# Patient Record
Sex: MALE | Race: WHITE | NOT HISPANIC OR LATINO | Employment: OTHER | URBAN - NONMETROPOLITAN AREA
[De-identification: names, ages, dates, MRNs, and addresses within clinical notes are randomized per-mention and may not be internally consistent; named-entity substitution may affect disease eponyms.]

---

## 2024-11-11 ENCOUNTER — TELEPHONE (OUTPATIENT)
Dept: INTERNAL MEDICINE | Facility: CLINIC | Age: 74
End: 2024-11-11
Payer: MEDICARE

## 2024-11-11 NOTE — TELEPHONE ENCOUNTER
Caller: Mynor Flanagan    Relationship to patient: Self    Best call back number: [phone]     Chief complaint:     Type of visit: NEW PATIENT     Requested date:      If rescheduling, when is the original appointment:      Additional notes:PATIENT WAS TOLD BY DR CARTER THAT HE WOULD TAKE HIM ON AS A NEW PATIENT.

## 2024-11-11 NOTE — TELEPHONE ENCOUNTER
Do you care to schedule patient, I have 3 other patient's asking for sooner appointments because they have been bumped. Wherever there is a 30 min slot can you put him in please.

## 2025-02-14 ENCOUNTER — OFFICE VISIT (OUTPATIENT)
Dept: INTERNAL MEDICINE | Facility: CLINIC | Age: 75
End: 2025-02-14
Payer: MEDICARE

## 2025-02-14 VITALS
OXYGEN SATURATION: 94 % | DIASTOLIC BLOOD PRESSURE: 66 MMHG | SYSTOLIC BLOOD PRESSURE: 142 MMHG | BODY MASS INDEX: 30.52 KG/M2 | WEIGHT: 218 LBS | RESPIRATION RATE: 16 BRPM | HEART RATE: 83 BPM | TEMPERATURE: 96.7 F | HEIGHT: 71 IN

## 2025-02-14 DIAGNOSIS — E55.9 VITAMIN D DEFICIENCY, UNSPECIFIED: ICD-10-CM

## 2025-02-14 DIAGNOSIS — I10 PRIMARY HYPERTENSION: ICD-10-CM

## 2025-02-14 DIAGNOSIS — E11.65 TYPE 2 DIABETES MELLITUS WITH HYPERGLYCEMIA, WITHOUT LONG-TERM CURRENT USE OF INSULIN: Primary | ICD-10-CM

## 2025-02-14 DIAGNOSIS — G47.33 OBSTRUCTIVE SLEEP APNEA SYNDROME: ICD-10-CM

## 2025-02-14 DIAGNOSIS — G47.33 OSA (OBSTRUCTIVE SLEEP APNEA): ICD-10-CM

## 2025-02-14 RX ORDER — LEVOTHYROXINE SODIUM 75 UG/1
75 TABLET ORAL DAILY
COMMUNITY
Start: 2025-01-05

## 2025-02-14 RX ORDER — LISINOPRIL AND HYDROCHLOROTHIAZIDE 10; 12.5 MG/1; MG/1
1 TABLET ORAL DAILY
Qty: 90 TABLET | Refills: 3 | Status: SHIPPED | OUTPATIENT
Start: 2025-02-14

## 2025-02-14 RX ORDER — LISINOPRIL 10 MG/1
10 TABLET ORAL DAILY
Qty: 90 TABLET | Refills: 3 | Status: SHIPPED | OUTPATIENT
Start: 2025-02-14 | End: 2025-02-14

## 2025-02-14 NOTE — PROGRESS NOTES
"Subjective     Patient ID: Mynor Flanagan is a 74 y.o. male. Patient is here for management of multiple medical problems.     Chief Complaint   Patient presents with    Hypertension    Diabetes     History of Present Illness   Htn    Dm    Knee pan and surgery in feb.     Back pain since. Last may 2024.        The following portions of the patient's history were reviewed and updated as appropriate: allergies, current medications, past family history, past medical history, past social history, past surgical history and problem list.    Review of Systems    Current Outpatient Medications:     aspirin 81 MG EC tablet, Take 1 tablet by mouth Daily., Disp: , Rfl:     cephalexin (KEFLEX) 500 MG capsule, Take 1 capsule by mouth., Disp: , Rfl:     clonazePAM (KlonoPIN) 1 MG tablet, , Disp: , Rfl:     fluorouracil (EFUDEX) 5 % cream, Apply  topically to the appropriate area as directed 2 (Two) Times a Day., Disp: , Rfl:     glipizide (GLUCOTROL) 5 MG tablet, TAKE 1 TABLET EVERY MORNING DAILY BEFORE BREAKFAST., Disp: , Rfl:     levothyroxine (SYNTHROID, LEVOTHROID) 75 MCG tablet, Take 1 tablet by mouth Daily., Disp: , Rfl:     metFORMIN (GLUCOPHAGE) 1000 MG tablet, , Disp: , Rfl:     multivitamin with minerals tablet tablet, Take 1 tablet by mouth Daily., Disp: , Rfl:     Omega-3 Fatty Acids (fish oil) 1000 MG capsule capsule, Take  by mouth Daily With Breakfast., Disp: , Rfl:     SIMVASTATIN PO, Take  by mouth., Disp: , Rfl:     lisinopril-hydrochlorothiazide (Zestoretic) 10-12.5 MG per tablet, Take 1 tablet by mouth Daily., Disp: 90 tablet, Rfl: 3    Objective      Blood pressure 142/66, pulse 83, temperature 96.7 °F (35.9 °C), resp. rate 16, height 180.3 cm (71\"), weight 98.9 kg (218 lb), SpO2 94%.    BMI is >= 30 and <35. (Class 1 Obesity). The following options were offered after discussion;: weight loss educational material (shared in after visit summary), exercise counseling/recommendations, and nutrition " counseling/recommendations       Physical Exam     General Appearance:    Alert, cooperative, no distress, appears stated age   Head:    Normocephalic, without obvious abnormality, atraumatic   Eyes:    PERRL, conjunctiva/corneas clear, EOM's intact   Ears:    Normal TM's and external ear canals, both ears   Nose:   Nares normal, septum midline, mucosa normal, no drainage   or sinus tenderness   Throat:   Lips, mucosa, and tongue normal; teeth and gums normal   Neck:   Supple, symmetrical, trachea midline, no adenopathy;        thyroid:  No enlargement/tenderness/nodules; no carotid    bruit or JVD   Back:     Symmetric, no curvature, ROM normal, no CVA tenderness   Lungs:     Clear to auscultation bilaterally, respirations unlabored   Chest wall:    No tenderness or deformity   Heart:    Regular rate and rhythm, S1 and S2 normal, no murmur,        rub or gallop   Abdomen:     Soft, non-tender, bowel sounds active all four quadrants,     no masses, no organomegaly   Extremities:   Extremities normal, atraumatic, no cyanosis or edema   Pulses:   2+ and symmetric all extremities   Skin:   Skin color, texture, turgor normal, no rashes or lesions   Lymph nodes:   Cervical, supraclavicular, and axillary nodes normal   Neurologic:   CNII-XII intact. Normal strength, sensation and reflexes       throughout      No results found for this or any previous visit.      Assessment & Plan   Increased back pain.  Hx of bone sprus back.        Knee pain in inj coming up in march.      Seen chiropractor        Worked in [employer].      Htn    Renal stones.    No sleep study. On clonazapam. Needs to stop.    Unable to sleep.   Up every 2 hours.       Diagnoses and all orders for this visit:    1. Type 2 diabetes mellitus with hyperglycemia, without long-term current use of insulin (Primary)  -     Lipid Panel  -     CBC & Differential  -     Vitamin B12  -     Comprehensive Metabolic Panel  -     Hemoglobin A1c  -     Microalbumin /  Creatinine Urine Ratio - Urine, Clean Catch  -     Vitamin D,25-Hydroxy  -     TSH    2. Primary hypertension  -     Lipid Panel  -     CBC & Differential  -     Vitamin B12  -     Comprehensive Metabolic Panel  -     Hemoglobin A1c  -     Microalbumin / Creatinine Urine Ratio - Urine, Clean Catch  -     Vitamin D,25-Hydroxy  -     TSH  -     lisinopril-hydrochlorothiazide (Zestoretic) 10-12.5 MG per tablet; Take 1 tablet by mouth Daily.  Dispense: 90 tablet; Refill: 3  -     Home Sleep Study    3. Vitamin D deficiency, unspecified  -     Vitamin D,25-Hydroxy    4. JEOVANNY (obstructive sleep apnea)    5. Obstructive sleep apnea syndrome  -     Home Sleep Study    Other orders  -     Discontinue: lisinopril (PRINIVIL,ZESTRIL) 10 MG tablet; Take 1 tablet by mouth Daily.  Dispense: 90 tablet; Refill: 3      Return in about 4 weeks (around 3/14/2025).          There are no Patient Instructions on file for this visit.     Manuel Aaron MD    Assessment & Plan

## 2025-02-27 ENCOUNTER — TELEPHONE (OUTPATIENT)
Dept: INTERNAL MEDICINE | Facility: CLINIC | Age: 75
End: 2025-02-27
Payer: MEDICARE

## 2025-02-27 NOTE — TELEPHONE ENCOUNTER
Caller: Mynor Flanagan    Relationship: Self    Best call back number: [phone]     What is the best time to reach you: ANY    Who are you requesting to speak with (clinical staff, provider,  specific staff member): CLINICAL/REFERRAL STAFF    What was the call regarding: RECEIVED THE SLEEP STUDY TEST IN THE MAIL WITH A NOTE STATING PATIENT IS RESPONSIBLE FOR ALL UNCOVERED CHARGES. PATIENT IS ASKING FOR A CALL BACK TO CONFIRM IF THIS WAS APPROVED, IF FULLY COVERED AND/OR WHAT HIS CO-PAY WOULD BE.    Is it okay if the provider responds through [employer]: YES

## 2025-03-12 LAB
25(OH)D3+25(OH)D2 SERPL-MCNC: 36.4 NG/ML (ref 30–100)
ALBUMIN SERPL-MCNC: 4.6 G/DL (ref 3.5–5.2)
ALBUMIN/CREAT UR: 13 MG/G CREAT (ref 0–29)
ALBUMIN/GLOB SERPL: 2 G/DL
ALP SERPL-CCNC: 68 U/L (ref 39–117)
ALT SERPL-CCNC: 16 U/L (ref 1–41)
AST SERPL-CCNC: 19 U/L (ref 1–40)
BASOPHILS # BLD AUTO: 0.05 10*3/MM3 (ref 0–0.2)
BASOPHILS NFR BLD AUTO: 0.9 % (ref 0–1.5)
BILIRUB SERPL-MCNC: 1.2 MG/DL (ref 0–1.2)
BUN SERPL-MCNC: 25 MG/DL (ref 8–23)
BUN/CREAT SERPL: 20.3 (ref 7–25)
CALCIUM SERPL-MCNC: 9.8 MG/DL (ref 8.6–10.5)
CHLORIDE SERPL-SCNC: 98 MMOL/L (ref 98–107)
CHOLEST SERPL-MCNC: 122 MG/DL (ref 0–200)
CO2 SERPL-SCNC: 24.1 MMOL/L (ref 22–29)
CREAT SERPL-MCNC: 1.23 MG/DL (ref 0.76–1.27)
CREAT UR-MCNC: 163 MG/DL
EGFRCR SERPLBLD CKD-EPI 2021: 62.4 ML/MIN/1.73
EOSINOPHIL # BLD AUTO: 0.14 10*3/MM3 (ref 0–0.4)
EOSINOPHIL NFR BLD AUTO: 2.7 % (ref 0.3–6.2)
ERYTHROCYTE [DISTWIDTH] IN BLOOD BY AUTOMATED COUNT: 13.4 % (ref 12.3–15.4)
GLOBULIN SER CALC-MCNC: 2.3 GM/DL
GLUCOSE SERPL-MCNC: 179 MG/DL (ref 65–99)
HBA1C MFR BLD: 6.8 % (ref 4.8–5.6)
HCT VFR BLD AUTO: 39.4 % (ref 37.5–51)
HDLC SERPL-MCNC: 31 MG/DL (ref 40–60)
HGB BLD-MCNC: 13.6 G/DL (ref 13–17.7)
IMM GRANULOCYTES # BLD AUTO: 0.02 10*3/MM3 (ref 0–0.05)
IMM GRANULOCYTES NFR BLD AUTO: 0.4 % (ref 0–0.5)
LDLC SERPL CALC-MCNC: 68 MG/DL (ref 0–100)
LYMPHOCYTES # BLD AUTO: 0.96 10*3/MM3 (ref 0.7–3.1)
LYMPHOCYTES NFR BLD AUTO: 18.2 % (ref 19.6–45.3)
MCH RBC QN AUTO: 33.3 PG (ref 26.6–33)
MCHC RBC AUTO-ENTMCNC: 34.5 G/DL (ref 31.5–35.7)
MCV RBC AUTO: 96.3 FL (ref 79–97)
MICROALBUMIN UR-MCNC: 21.8 UG/ML
MONOCYTES # BLD AUTO: 0.47 10*3/MM3 (ref 0.1–0.9)
MONOCYTES NFR BLD AUTO: 8.9 % (ref 5–12)
NEUTROPHILS # BLD AUTO: 3.63 10*3/MM3 (ref 1.7–7)
NEUTROPHILS NFR BLD AUTO: 68.9 % (ref 42.7–76)
NRBC BLD AUTO-RTO: 0 /100 WBC (ref 0–0.2)
PLATELET # BLD AUTO: 223 10*3/MM3 (ref 140–450)
POTASSIUM SERPL-SCNC: 4.1 MMOL/L (ref 3.5–5.2)
PROT SERPL-MCNC: 6.9 G/DL (ref 6–8.5)
RBC # BLD AUTO: 4.09 10*6/MM3 (ref 4.14–5.8)
SODIUM SERPL-SCNC: 138 MMOL/L (ref 136–145)
TRIGL SERPL-MCNC: 126 MG/DL (ref 0–150)
TSH SERPL DL<=0.005 MIU/L-ACNC: 1.89 UIU/ML (ref 0.27–4.2)
VIT B12 SERPL-MCNC: 454 PG/ML (ref 211–946)
VLDLC SERPL CALC-MCNC: 23 MG/DL (ref 5–40)
WBC # BLD AUTO: 5.27 10*3/MM3 (ref 3.4–10.8)

## 2025-03-17 ENCOUNTER — OFFICE VISIT (OUTPATIENT)
Dept: INTERNAL MEDICINE | Facility: CLINIC | Age: 75
End: 2025-03-17
Payer: MEDICARE

## 2025-03-17 VITALS
HEART RATE: 77 BPM | BODY MASS INDEX: 29.4 KG/M2 | TEMPERATURE: 96.7 F | SYSTOLIC BLOOD PRESSURE: 102 MMHG | HEIGHT: 71 IN | WEIGHT: 210 LBS | RESPIRATION RATE: 16 BRPM | DIASTOLIC BLOOD PRESSURE: 60 MMHG | OXYGEN SATURATION: 95 %

## 2025-03-17 DIAGNOSIS — U07.1 COVID-19 VIRUS DETECTED: ICD-10-CM

## 2025-03-17 DIAGNOSIS — E11.9 TYPE 2 DIABETES MELLITUS WITHOUT COMPLICATION, WITHOUT LONG-TERM CURRENT USE OF INSULIN: ICD-10-CM

## 2025-03-17 DIAGNOSIS — R05.9 COUGH, UNSPECIFIED TYPE: Primary | ICD-10-CM

## 2025-03-17 LAB
EXPIRATION DATE: ABNORMAL
FLUAV AG UPPER RESP QL IA.RAPID: NOT DETECTED
FLUBV AG UPPER RESP QL IA.RAPID: NOT DETECTED
INTERNAL CONTROL: ABNORMAL
Lab: ABNORMAL
SARS-COV-2 AG UPPER RESP QL IA.RAPID: DETECTED

## 2025-03-17 NOTE — PROGRESS NOTES
"Subjective     Patient ID: Mynor Flanagan is a 74 y.o. male. Patient is here for management of multiple medical problems.     Chief Complaint   Patient presents with    Diabetes    Cough    Sinus Problem     History of Present Illness   3 day upper respritor congestion.    dx DM   In the past with prior pcp.      The following portions of the patient's history were reviewed and updated as appropriate: allergies, current medications, past family history, past medical history, past social history, past surgical history and problem list.    Review of Systems    Current Outpatient Medications:     aspirin 81 MG EC tablet, Take 1 tablet by mouth Daily., Disp: , Rfl:     clonazePAM (KlonoPIN) 1 MG tablet, , Disp: , Rfl:     fluorouracil (EFUDEX) 5 % cream, Apply  topically to the appropriate area as directed 2 (Two) Times a Day., Disp: , Rfl:     glipizide (GLUCOTROL) 5 MG tablet, TAKE 1 TABLET EVERY MORNING DAILY BEFORE BREAKFAST., Disp: , Rfl:     levothyroxine (SYNTHROID, LEVOTHROID) 75 MCG tablet, Take 1 tablet by mouth Daily., Disp: , Rfl:     lisinopril-hydrochlorothiazide (Zestoretic) 10-12.5 MG per tablet, Take 1 tablet by mouth Daily., Disp: 90 tablet, Rfl: 3    metFORMIN (GLUCOPHAGE) 1000 MG tablet, , Disp: , Rfl:     multivitamin with minerals tablet tablet, Take 1 tablet by mouth Daily., Disp: , Rfl:     Omega-3 Fatty Acids (fish oil) 1000 MG capsule capsule, Take  by mouth Daily With Breakfast., Disp: , Rfl:     SIMVASTATIN PO, Take  by mouth., Disp: , Rfl:     Objective      Blood pressure 102/60, pulse 77, temperature 96.7 °F (35.9 °C), resp. rate 16, height 180.3 cm (71\"), weight 95.3 kg (210 lb), SpO2 95%.            Physical Exam     General Appearance:    Alert, cooperative, no distress, appears stated age   Head:    Normocephalic, without obvious abnormality, atraumatic   Eyes:    PERRL, conjunctiva/corneas clear, EOM's intact   Ears:    Normal TM's and external ear canals, both ears   Nose:   Nares " normal, septum midline, mucosa normal, no drainage   or sinus tenderness   Throat:   Lips, mucosa, and tongue normal; teeth and gums normal   Neck:   Supple, symmetrical, trachea midline, no adenopathy;        thyroid:  No enlargement/tenderness/nodules; no carotid    bruit or JVD   Back:     Symmetric, no curvature, ROM normal, no CVA tenderness   Lungs:     Clear to auscultation bilaterally, respirations unlabored   Chest wall:    No tenderness or deformity   Heart:    Regular rate and rhythm, S1 and S2 normal, no murmur,        rub or gallop   Abdomen:     Soft, non-tender, bowel sounds active all four quadrants,     no masses, no organomegaly   Extremities:   Extremities normal, atraumatic, no cyanosis or edema   Pulses:   2+ and symmetric all extremities   Skin:   Skin color, texture, turgor normal, no rashes or lesions   Lymph nodes:   Cervical, supraclavicular, and axillary nodes normal   Neurologic:   CNII-XII intact. Normal strength, sensation and reflexes       throughout      Results for orders placed or performed in visit on 03/17/25   POCT SARS-CoV-2 Antigen TIFFANIE + Flu    Collection Time: 03/17/25 10:54 AM    Specimen: Swab   Result Value Ref Range    SARS Antigen Detected (A) Not Detected, Presumptive Negative    Influenza A Antigen TIFFANIE Not Detected Not Detected    Influenza B Antigen TIFFNAIE Not Detected Not Detected    Internal Control Passed Passed    Lot Number [id number]     Expiration Date 11/16/2026          Assessment & Plan   Symptomatic management.    Will bring back to discuss DM  7.8 in the past.    Needs to increase exercise to bring good hdl up.      Will get poc Ha1c on rtc.     home sleep study done.  Will need to get report.            Diagnoses and all orders for this visit:    1. Cough, unspecified type (Primary)  -     POCT SARS-CoV-2 Antigen TIFFANIE + Flu    2. COVID-19 virus detected    3. Type 2 diabetes mellitus without complication, without long-term current use of insulin  -      Microalbumin / Creatinine Urine Ratio - Urine, Clean Catch      Return in about 3 months (around 6/17/2025).          There are no Patient Instructions on file for this visit.     Manuel Aaron MD    Assessment & Plan

## 2025-03-26 ENCOUNTER — PATIENT MESSAGE (OUTPATIENT)
Dept: INTERNAL MEDICINE | Facility: CLINIC | Age: 75
End: 2025-03-26
Payer: MEDICARE

## 2025-03-26 RX ORDER — LEVOTHYROXINE SODIUM 75 UG/1
75 TABLET ORAL DAILY
Qty: 90 TABLET | Refills: 3 | Status: SHIPPED | OUTPATIENT
Start: 2025-03-26

## 2025-03-26 RX ORDER — POTASSIUM CHLORIDE 1500 MG/1
20 TABLET, EXTENDED RELEASE ORAL DAILY
COMMUNITY

## 2025-03-26 NOTE — TELEPHONE ENCOUNTER
Rx Refill Note  Last Rx historical provider, do you want to manage medication  Requested Prescriptions     Pending Prescriptions Disp Refills    levothyroxine (SYNTHROID, LEVOTHROID) 75 MCG tablet       Sig: Take 1 tablet by mouth Daily.      Last office visit with prescribing clinician: 3/17/2025   Last telemedicine visit with prescribing clinician: Visit date not found   Next office visit with prescribing clinician: 6/24/2025                         Would you like a call back once the refill request has been completed: [] Yes [] No    If the office needs to give you a call back, can they leave a voicemail: [] Yes [] No    Timo Truong CMA  03/26/25, 14:09 EDTRx Refill Note  Requested Prescriptions     Pending Prescriptions Disp Refills    levothyroxine (SYNTHROID, LEVOTHROID) 75 MCG tablet       Sig: Take 1 tablet by mouth Daily.      Last office visit with prescribing clinician: 3/17/2025   Last telemedicine visit with prescribing clinician: Visit date not found   Next office visit with prescribing clinician: 6/24/2025                         Would you like a call back once the refill request has been completed: [] Yes [] No    If the office needs to give you a call back, can they leave a voicemail: [] Yes [] No    Timo Truong CMA  03/26/25, 14:08 EDT

## 2025-05-15 DIAGNOSIS — I10 ESSENTIAL HYPERTENSION: ICD-10-CM

## 2025-05-15 DIAGNOSIS — E11.9 TYPE 2 DIABETES MELLITUS WITHOUT COMPLICATIONS: ICD-10-CM

## 2025-05-15 DIAGNOSIS — I10 ESSENTIAL (PRIMARY) HYPERTENSION: ICD-10-CM

## 2025-05-15 RX ORDER — HYDROCHLOROTHIAZIDE 50 MG/1
50 TABLET ORAL DAILY
Qty: 90 TABLET | Refills: 1 | OUTPATIENT
Start: 2025-05-15

## 2025-05-27 RX ORDER — SIMVASTATIN 20 MG
20 TABLET ORAL NIGHTLY
Qty: 90 TABLET | Refills: 3 | Status: SHIPPED | OUTPATIENT
Start: 2025-05-27

## 2025-05-27 NOTE — TELEPHONE ENCOUNTER
"Caller: Mynor Flanagan \"[place]\"    Relationship: Self    Best call back number: [phone]     Requested Prescriptions:   Requested Prescriptions     Pending Prescriptions Disp Refills    simvastatin (ZOCOR) 20 MG tablet       Sig: Take  by mouth.        Pharmacy where request should be sent: [facility] #[id number] - [place], [place] - [address] [place] [address] [phone] [facility] [phone]      Last office visit with prescribing clinician: 3/17/2025   Last telemedicine visit with prescribing clinician: Visit date not found   Next office visit with prescribing clinician: 6/24/2025     Additional details provided by patient: PATIENT IS OUT OF THE MEDICATION AND IS LEAVING TOWN TOMORROW    Does the patient have less than a 3 day supply:  [x] Yes  [] No    Would you like a call back once the refill request has been completed: [x] Yes [] No    If the office needs to give you a call back, can they leave a voicemail: [x] Yes [] No    Antonio Velez Rep   05/27/25 09:24 EDT       "

## 2025-05-27 NOTE — TELEPHONE ENCOUNTER
Rx Refill Note  I do not find that you have ever prescribed?  Do you wish to manage?  Requested Prescriptions     Pending Prescriptions Disp Refills    simvastatin (ZOCOR) 20 MG tablet       Sig: Take  by mouth.      Last office visit with prescribing clinician: 3/17/2025   Last telemedicine visit with prescribing clinician: Visit date not found   Next office visit with prescribing clinician: 6/24/2025                         Would you like a call back once the refill request has been completed: [] Yes [] No    If the office needs to give you a call back, can they leave a voicemail: [] Yes [] No    Timo Truong CMA  05/27/25, 10:01 EDT

## 2025-06-13 ENCOUNTER — TELEPHONE (OUTPATIENT)
Dept: INTERNAL MEDICINE | Facility: CLINIC | Age: 75
End: 2025-06-13
Payer: MEDICARE

## 2025-06-13 NOTE — TELEPHONE ENCOUNTER
Pt called and states he has been on the carnivore diet for a couple of months and has been having diarrhea. He reports that he goes 10-12 times a day, small volumes. He reports no fever/cramping/bloating but is starting to have some discomfort in his stomach. He has tried OTC anti diarrheal medicines that have not helped. Pt is out of town till Monday but wants to know if he can be worked in or if Dr Aaron wants to order testing for him. Please advise.

## 2025-06-16 ENCOUNTER — OFFICE VISIT (OUTPATIENT)
Dept: INTERNAL MEDICINE | Facility: CLINIC | Age: 75
End: 2025-06-16
Payer: MEDICARE

## 2025-06-16 VITALS
TEMPERATURE: 97.5 F | RESPIRATION RATE: 16 BRPM | OXYGEN SATURATION: 98 % | BODY MASS INDEX: 27.44 KG/M2 | DIASTOLIC BLOOD PRESSURE: 68 MMHG | SYSTOLIC BLOOD PRESSURE: 102 MMHG | WEIGHT: 196 LBS | HEIGHT: 71 IN | HEART RATE: 69 BPM

## 2025-06-16 DIAGNOSIS — T78.40XA ALLERGY, UNSPECIFIED, INITIAL ENCOUNTER: ICD-10-CM

## 2025-06-16 DIAGNOSIS — E11.9 TYPE 2 DIABETES MELLITUS WITHOUT COMPLICATION, WITHOUT LONG-TERM CURRENT USE OF INSULIN: Primary | ICD-10-CM

## 2025-06-16 DIAGNOSIS — R19.7 DIARRHEA DUE TO MALABSORPTION: ICD-10-CM

## 2025-06-16 DIAGNOSIS — K90.9 DIARRHEA DUE TO MALABSORPTION: ICD-10-CM

## 2025-06-16 DIAGNOSIS — R19.7 DIARRHEA OF PRESUMED INFECTIOUS ORIGIN: ICD-10-CM

## 2025-06-16 PROCEDURE — G2211 COMPLEX E/M VISIT ADD ON: HCPCS | Performed by: INTERNAL MEDICINE

## 2025-06-16 PROCEDURE — 99214 OFFICE O/P EST MOD 30 MIN: CPT | Performed by: INTERNAL MEDICINE

## 2025-06-16 PROCEDURE — 1126F AMNT PAIN NOTED NONE PRSNT: CPT | Performed by: INTERNAL MEDICINE

## 2025-06-16 PROCEDURE — 3044F HG A1C LEVEL LT 7.0%: CPT | Performed by: INTERNAL MEDICINE

## 2025-06-16 RX ORDER — VIT C/B6/B5/MAGNESIUM/HERB 173 50-5-6-5MG
CAPSULE ORAL
COMMUNITY
End: 2025-06-16

## 2025-06-16 NOTE — PROGRESS NOTES
"Subjective     Patient ID: Mynor Flanagan is a 75 y.o. male. Patient is here for management of multiple medical problems.     Chief Complaint   Patient presents with    Diarrhea     2 months    Weakness - Generalized     History of Present Illness   Diarrhea    Diet changes going better.            The following portions of the patient's history were reviewed and updated as appropriate: allergies, current medications, past family history, past medical history, past social history, past surgical history and problem list.    Review of Systems    Current Outpatient Medications:     clonazePAM (KlonoPIN) 1 MG tablet, , Disp: , Rfl:     fluorouracil (EFUDEX) 5 % cream, Apply  topically to the appropriate area as directed 2 (Two) Times a Day., Disp: , Rfl:     glipizide (GLUCOTROL) 5 MG tablet, Take 1 tablet by mouth 2 (Two) Times a Day Before Meals. Take 10mg in the AM, and 5mg in the PM, Disp: , Rfl:     levothyroxine (SYNTHROID, LEVOTHROID) 75 MCG tablet, Take 1 tablet by mouth Daily., Disp: 90 tablet, Rfl: 3    lisinopril-hydrochlorothiazide (Zestoretic) 10-12.5 MG per tablet, Take 1 tablet by mouth Daily., Disp: 90 tablet, Rfl: 3    metFORMIN (GLUCOPHAGE) 1000 MG tablet, TAKE 1 TABLET BY MOUTH TWICE A DAY, Disp: 180 tablet, Rfl: 1    multivitamin with minerals tablet tablet, Take 1 tablet by mouth Daily., Disp: , Rfl:     potassium chloride ER (K-TAB) 20 MEQ tablet controlled-release ER tablet, Take 1 tablet by mouth Daily., Disp: , Rfl:     simvastatin (ZOCOR) 20 MG tablet, Take 1 tablet by mouth Every Night., Disp: 90 tablet, Rfl: 3    Objective      Blood pressure 102/68, pulse 69, temperature 97.5 °F (36.4 °C), resp. rate 16, height 180.3 cm (71\"), weight 88.9 kg (196 lb), SpO2 98%.            Physical Exam     General Appearance:    Alert, cooperative, no distress, appears stated age   Head:    Normocephalic, without obvious abnormality, atraumatic   Eyes:    PERRL, conjunctiva/corneas clear, EOM's intact   Ears: "    Normal TM's and external ear canals, both ears   Nose:   Nares normal, septum midline, mucosa normal, no drainage   or sinus tenderness   Throat:   Lips, mucosa, and tongue normal; teeth and gums normal   Neck:   Supple, symmetrical, trachea midline, no adenopathy;        thyroid:  No enlargement/tenderness/nodules; no carotid    bruit or JVD   Back:     Symmetric, no curvature, ROM normal, no CVA tenderness   Lungs:     Clear to auscultation bilaterally, respirations unlabored   Chest wall:    No tenderness or deformity   Heart:    Regular rate and rhythm, S1 and S2 normal, no murmur,        rub or gallop   Abdomen:     Soft, non-tender, bowel sounds active all four quadrants,     no masses, no organomegaly   Extremities:   Extremities normal, atraumatic, no cyanosis or edema   Pulses:   2+ and symmetric all extremities   Skin:   Skin color, texture, turgor normal, no rashes or lesions   Lymph nodes:   Cervical, supraclavicular, and axillary nodes normal   Neurologic:   CNII-XII intact. Normal strength, sensation and reflexes       throughout      Results for orders placed or performed in visit on 03/17/25   POCT SARS-CoV-2 Antigen TIFFANIE + Flu    Collection Time: 03/17/25 10:54 AM    Specimen: Swab   Result Value Ref Range    SARS Antigen Detected (A) Not Detected, Presumptive Negative    Influenza A Antigen TIFFANIE Not Detected Not Detected    Influenza B Antigen TIFFANIE Not Detected Not Detected    Internal Control Passed Passed    Lot Number [id number]     Expiration Date 11/16/2026          Assessment & Plan     Lower back pain    DM      Diarrhea.  Watery. With food frags. Not much solid. 10-12 x  day.  Mowed yard. Only one  accidents.    Stopped bread. Now on carnivore diet. Ham turkey cheese roll ups.     Occ salid and fruits.    No bloating no cramping.    Going to gym and feels well.    Will stop metformin.      Wt down 22 lbs since Feb.    Will decrease glip 2 am  and 1 pm. Decrease 1 bid.         Will stop  tumeric also.    Kg ahi25 at 215 lbs.  Now waking with normal BS and sleeping better and wt down to 196.   Kg may not be an issue or resolving with wt loss.      Diagnoses and all orders for this visit:    1. Type 2 diabetes mellitus without complication, without long-term current use of insulin (Primary)  -     CBC & Differential  -     Comprehensive Metabolic Panel  -     Vitamin D,25-Hydroxy  -     Hemoglobin A1c  -     Food Allergy Profile  -     Alpha-Gal IgE Panel    2. Diarrhea due to malabsorption  -     CBC & Differential  -     Comprehensive Metabolic Panel  -     Vitamin D,25-Hydroxy  -     Hemoglobin A1c  -     Food Allergy Profile  -     Alpha-Gal IgE Panel  -     Stool Culture (Reference Lab) - Stool, Per Rectum  -     Fecal Fat, Qualitative - Stool, Per Rectum  -     Fecal Leukocytes - Stool, Per Rectum  -     Pancreatic Elastase, Fecal - Stool, Per Rectum  -     Clostridioides difficile EIA - Stool, Per Rectum    3. Diarrhea of presumed infectious origin  -     CBC & Differential  -     Comprehensive Metabolic Panel  -     Vitamin D,25-Hydroxy  -     Hemoglobin A1c  -     Food Allergy Profile  -     Alpha-Gal IgE Panel  -     Stool Culture (Reference Lab) - Stool, Per Rectum  -     Fecal Fat, Qualitative - Stool, Per Rectum  -     Fecal Leukocytes - Stool, Per Rectum  -     Pancreatic Elastase, Fecal - Stool, Per Rectum  -     Clostridioides difficile EIA - Stool, Per Rectum    4. Allergy, unspecified, initial encounter  -     Food Allergy Profile  -     Alpha-Gal IgE Panel      Return in about 6 weeks (around 7/28/2025).          There are no Patient Instructions on file for this visit.     Manuel Aaron MD    Assessment & Plan

## 2025-06-25 LAB
BACTERIA SPEC CULT: NORMAL
BACTERIA SPEC CULT: NORMAL
C DIFF TOX A+B STL QL IA: NEGATIVE
CAMPYLOBACTER STL CULT: NORMAL
E COLI SXT STL QL IA: NEGATIVE
ELASTASE PANC STL-MCNT: 348 UG ELAST./G
FA STL QL: NORMAL
NEUTRAL FAT STL QL: NORMAL
SALM + SHIG STL CULT: NORMAL
WBC STL QL MICRO: NORMAL
WBC STL QL MICRO: NORMAL

## 2025-07-08 RX ORDER — POTASSIUM CHLORIDE 1500 MG/1
20 TABLET, EXTENDED RELEASE ORAL DAILY
Qty: 60 TABLET | Refills: 3 | Status: SHIPPED | OUTPATIENT
Start: 2025-07-08

## 2025-07-08 NOTE — TELEPHONE ENCOUNTER
Rx Refill Note  Requested Prescriptions     Pending Prescriptions Disp Refills    potassium chloride ER (K-TAB) 20 MEQ tablet controlled-release ER tablet 60 tablet      Sig: Take 1 tablet by mouth Daily.      Last office visit with prescribing clinician: 6/16/2025   Last telemedicine visit with prescribing clinician: Visit date not found   Next office visit with prescribing clinician: 7/28/2025                         Would you like a call back once the refill request has been completed: [] Yes [] No    If the office needs to give you a call back, can they leave a voicemail: [] Yes [] No    Cullen Silva MA  07/08/25, 08:02 EDT

## 2025-07-25 LAB
25(OH)D3+25(OH)D2 SERPL-MCNC: 30.9 NG/ML (ref 30–100)
ALBUMIN SERPL-MCNC: 4.8 G/DL (ref 3.5–5.2)
ALBUMIN/GLOB SERPL: 2.1 G/DL
ALP SERPL-CCNC: 68 U/L (ref 39–117)
ALPHA-GAL IGE QN: <0.1 KU/L
ALT SERPL-CCNC: 15 U/L (ref 1–41)
AST SERPL-CCNC: 18 U/L (ref 1–40)
BASOPHILS # BLD AUTO: 0.03 10*3/MM3 (ref 0–0.2)
BASOPHILS NFR BLD AUTO: 0.5 % (ref 0–1.5)
BEEF IGE QN: <0.1 KU/L
BILIRUB SERPL-MCNC: 1.3 MG/DL (ref 0–1.2)
BUN SERPL-MCNC: 21 MG/DL (ref 8–23)
BUN/CREAT SERPL: 16.4 (ref 7–25)
CALCIUM SERPL-MCNC: 10.2 MG/DL (ref 8.6–10.5)
CHLORIDE SERPL-SCNC: 102 MMOL/L (ref 98–107)
CLAM IGE QN: <0.1 KU/L
CO2 SERPL-SCNC: 25.4 MMOL/L (ref 22–29)
CODFISH IGE QN: <0.1 KU/L
CORN IGE QN: <0.1 KU/L
COW MILK IGE QN: <0.1 KU/L
CREAT SERPL-MCNC: 1.28 MG/DL (ref 0.76–1.27)
EGFRCR SERPLBLD CKD-EPI 2021: 59.1 ML/MIN/1.73
EGG WHITE IGE QN: <0.1 KU/L
EOSINOPHIL # BLD AUTO: 0.14 10*3/MM3 (ref 0–0.4)
EOSINOPHIL NFR BLD AUTO: 2.2 % (ref 0.3–6.2)
ERYTHROCYTE [DISTWIDTH] IN BLOOD BY AUTOMATED COUNT: 14.3 % (ref 12.3–15.4)
GLOBULIN SER CALC-MCNC: 2.3 GM/DL
GLUCOSE SERPL-MCNC: 106 MG/DL (ref 65–99)
HBA1C MFR BLD: 6.1 % (ref 4.8–5.6)
HCT VFR BLD AUTO: 39.9 % (ref 37.5–51)
HGB BLD-MCNC: 13.5 G/DL (ref 13–17.7)
IGE SERPL-ACNC: 9 IU/ML (ref 6–495)
IMM GRANULOCYTES # BLD AUTO: 0.02 10*3/MM3 (ref 0–0.05)
IMM GRANULOCYTES NFR BLD AUTO: 0.3 % (ref 0–0.5)
LAMB IGE QN: <0.1 KU/L
LYMPHOCYTES # BLD AUTO: 1.02 10*3/MM3 (ref 0.7–3.1)
LYMPHOCYTES NFR BLD AUTO: 16.1 % (ref 19.6–45.3)
MCH RBC QN AUTO: 33.3 PG (ref 26.6–33)
MCHC RBC AUTO-ENTMCNC: 33.8 G/DL (ref 31.5–35.7)
MCV RBC AUTO: 98.5 FL (ref 79–97)
MONOCYTES # BLD AUTO: 0.54 10*3/MM3 (ref 0.1–0.9)
MONOCYTES NFR BLD AUTO: 8.5 % (ref 5–12)
NEUTROPHILS # BLD AUTO: 4.6 10*3/MM3 (ref 1.7–7)
NEUTROPHILS NFR BLD AUTO: 72.4 % (ref 42.7–76)
NRBC BLD AUTO-RTO: 0 /100 WBC (ref 0–0.2)
PEANUT IGE QN: <0.1 KU/L
PLATELET # BLD AUTO: 230 10*3/MM3 (ref 140–450)
PORK IGE QN: <0.1 KU/L
POTASSIUM SERPL-SCNC: 4.1 MMOL/L (ref 3.5–5.2)
PROT SERPL-MCNC: 7.1 G/DL (ref 6–8.5)
RBC # BLD AUTO: 4.05 10*6/MM3 (ref 4.14–5.8)
SCALLOP IGE QN: <0.1 KU/L
SERVICE CMNT-IMP: NORMAL
SESAME SEED IGE QN: <0.1 KU/L
SHRIMP IGE QN: <0.1 KU/L
SODIUM SERPL-SCNC: 143 MMOL/L (ref 136–145)
SOYBEAN IGE QN: <0.1 KU/L
WALNUT IGE QN: <0.1 KU/L
WBC # BLD AUTO: 6.35 10*3/MM3 (ref 3.4–10.8)
WHEAT IGE QN: <0.1 KU/L

## 2025-07-28 ENCOUNTER — OFFICE VISIT (OUTPATIENT)
Dept: INTERNAL MEDICINE | Facility: CLINIC | Age: 75
End: 2025-07-28
Payer: MEDICARE

## 2025-07-28 VITALS
BODY MASS INDEX: 27.44 KG/M2 | HEIGHT: 71 IN | RESPIRATION RATE: 18 BRPM | TEMPERATURE: 97.8 F | OXYGEN SATURATION: 98 % | HEART RATE: 77 BPM | WEIGHT: 196 LBS | SYSTOLIC BLOOD PRESSURE: 123 MMHG | DIASTOLIC BLOOD PRESSURE: 61 MMHG

## 2025-07-28 DIAGNOSIS — E11.9 TYPE 2 DIABETES MELLITUS WITHOUT COMPLICATION, WITHOUT LONG-TERM CURRENT USE OF INSULIN: Primary | ICD-10-CM

## 2025-07-28 DIAGNOSIS — N28.9 ACUTE RENAL INSUFFICIENCY: ICD-10-CM

## 2025-07-28 PROCEDURE — 3044F HG A1C LEVEL LT 7.0%: CPT | Performed by: INTERNAL MEDICINE

## 2025-07-28 PROCEDURE — 99214 OFFICE O/P EST MOD 30 MIN: CPT | Performed by: INTERNAL MEDICINE

## 2025-07-28 PROCEDURE — G2211 COMPLEX E/M VISIT ADD ON: HCPCS | Performed by: INTERNAL MEDICINE

## 2025-07-28 PROCEDURE — 1126F AMNT PAIN NOTED NONE PRSNT: CPT | Performed by: INTERNAL MEDICINE

## 2025-07-28 NOTE — PROGRESS NOTES
"Subjective     Patient ID: Mynor Flanagan is a 75 y.o. male. Patient is here for management of multiple medical problems.     Chief Complaint   Patient presents with    Diabetes    Follow-up     Go over labs       Regular Follow-Up  Additional information: Bloodwork/med check     DM  Diet changes going well.    Ha1c gong down.     Working out 2-3 nights a week.    Stopped metformin.   Renal function out a bit. May be lab       Diarrhea better.          The following portions of the patient's history were reviewed and updated as appropriate: allergies, current medications, past family history, past medical history, past social history, past surgical history and problem list.    Review of Systems    Current Outpatient Medications:     clonazePAM (KlonoPIN) 1 MG tablet, Take 1 tablet by mouth every night at bedtime., Disp: , Rfl:     fluorouracil (EFUDEX) 5 % cream, Apply  topically to the appropriate area as directed 2 (Two) Times a Day., Disp: , Rfl:     glipizide (GLUCOTROL) 5 MG tablet, Take 1 tablet by mouth 2 (Two) Times a Day Before Meals. Take 10mg in the AM, and 5mg in the PM, Disp: , Rfl:     levothyroxine (SYNTHROID, LEVOTHROID) 75 MCG tablet, Take 1 tablet by mouth Daily., Disp: 90 tablet, Rfl: 3    lisinopril-hydrochlorothiazide (Zestoretic) 10-12.5 MG per tablet, Take 1 tablet by mouth Daily., Disp: 90 tablet, Rfl: 3    multivitamin with minerals tablet tablet, Take 1 tablet by mouth Daily., Disp: , Rfl:     potassium chloride ER (K-TAB) 20 MEQ tablet controlled-release ER tablet, Take 1 tablet by mouth Daily., Disp: 60 tablet, Rfl: 3    simvastatin (ZOCOR) 20 MG tablet, Take 1 tablet by mouth Every Night., Disp: 90 tablet, Rfl: 3    Objective      Blood pressure 123/61, pulse 77, temperature 97.8 °F (36.6 °C), temperature source Temporal, resp. rate 18, height 180.3 cm (71\"), weight 88.9 kg (196 lb), SpO2 98%.            Physical Exam     General Appearance:    Alert, cooperative, no distress, appears " stated age   Head:    Normocephalic, without obvious abnormality, atraumatic   Eyes:    PERRL, conjunctiva/corneas clear, EOM's intact   Ears:    Normal TM's and external ear canals, both ears   Nose:   Nares normal, septum midline, mucosa normal, no drainage   or sinus tenderness   Throat:   Lips, mucosa, and tongue normal; teeth and gums normal   Neck:   Supple, symmetrical, trachea midline, no adenopathy;        thyroid:  No enlargement/tenderness/nodules; no carotid    bruit or JVD   Back:     Symmetric, no curvature, ROM normal, no CVA tenderness   Lungs:     Clear to auscultation bilaterally, respirations unlabored   Chest wall:    No tenderness or deformity   Heart:    Regular rate and rhythm, S1 and S2 normal, no murmur,        rub or gallop   Abdomen:     Soft, non-tender, bowel sounds active all four quadrants,     no masses, no organomegaly   Extremities:   Extremities normal, atraumatic, no cyanosis or edema   Pulses:   2+ and symmetric all extremities   Skin:   Skin color, texture, turgor normal, no rashes or lesions   Lymph nodes:   Cervical, supraclavicular, and axillary nodes normal   Neurologic:   CNII-XII intact. Normal strength, sensation and reflexes       throughout      Results for orders placed or performed in visit on 06/16/25   Fecal Leukocytes - Stool, Per Rectum    Collection Time: 06/19/25  8:51 AM    Specimen: Per Rectum; Stool    ST     CD- [id number]   Result Value Ref Range    Fecal Leukocytes Final report None Seen    Result 1 Comment    Stool Culture (Reference Lab) - Stool, Per Rectum    Collection Time: 06/19/25  8:51 AM    Specimen: Per Rectum; Stool    ST     CD- [id number]   Result Value Ref Range    Salmonella/Shigella Screen Final report     Result 1 Comment     Campylobacter Culture Final report     Result 1 Comment     E coli, Shiga toxin Assay Negative Negative   Clostridioides difficile EIA - Stool, Per Rectum    Collection Time: 06/19/25  8:51 AM    Specimen: Per Rectum;  Stool    ST     - [id number]   Result Value Ref Range    C difficile Toxins A+B, EIA Negative Negative   Fecal Fat, Qualitative - Stool, Per Rectum    Collection Time: 06/19/25  8:51 AM    Specimen: Per Rectum; Stool   Result Value Ref Range    Fecal Fat Qualitative, Neutral Fats Normal     Fecal Fats, Qualititative, Total Normal    Pancreatic Elastase, Fecal - Stool, Per Rectum    Collection Time: 06/19/25  8:51 AM    Specimen: Per Rectum; Stool   Result Value Ref Range    Pancreatic Fecal 348 >200 ug Elast./g   Comprehensive Metabolic Panel    Collection Time: 07/22/25  1:55 PM    Specimen: Blood   Result Value Ref Range    Glucose 106 (H) 65 - 99 mg/dL    BUN 21.0 8.0 - 23.0 mg/dL    Creatinine 1.28 (H) 0.76 - 1.27 mg/dL    EGFR Result 59.1 (L) >60.0 mL/min/1.73    BUN/Creatinine Ratio 16.4 7.0 - 25.0    Sodium 143 136 - 145 mmol/L    Potassium 4.1 3.5 - 5.2 mmol/L    Chloride 102 98 - 107 mmol/L    Total CO2 25.4 22.0 - 29.0 mmol/L    Calcium 10.2 8.6 - 10.5 mg/dL    Total Protein 7.1 6.0 - 8.5 g/dL    Albumin 4.8 3.5 - 5.2 g/dL    Globulin 2.3 gm/dL    A/G Ratio 2.1 g/dL    Total Bilirubin 1.3 (H) 0.0 - 1.2 mg/dL    Alkaline Phosphatase 68 39 - 117 U/L    AST (SGOT) 18 1 - 40 U/L    ALT (SGPT) 15 1 - 41 U/L   Vitamin D,25-Hydroxy    Collection Time: 07/22/25  1:55 PM    Specimen: Blood   Result Value Ref Range    25 Hydroxy, Vitamin D 30.9 30.0 - 100.0 ng/ml   Hemoglobin A1c    Collection Time: 07/22/25  1:55 PM    Specimen: Blood   Result Value Ref Range    Hemoglobin A1C 6.10 (H) 4.80 - 5.60 %   Food Allergy Profile    Collection Time: 07/22/25  1:55 PM    Specimen: Blood   Result Value Ref Range    Egg White <0.10 Class 0 kU/L    Peanut <0.10 Class 0 kU/L    Soybean <0.10 Class 0 kU/L    Milk, Cow's <0.10 Class 0 kU/L    Clams <0.10 Class 0 kU/L    Shrimp <0.10 Class 0 kU/L    Gay <0.10 Class 0 kU/L    CodFish <0.10 Class 0 kU/L    Scallop <0.10 Class 0 kU/L    Wheat <0.10 Class 0 kU/L    Corn <0.10  Class 0 kU/L    Sesame Seed <0.10 Class 0 kU/L   Alpha-Gal IgE Panel    Collection Time: 07/22/25  1:55 PM    Specimen: Blood   Result Value Ref Range    Class Description Comment     IgE 9 6 - 495 IU/mL    Pork <0.10 Class 0 kU/L    Beef <0.10 Class 0 kU/L    Lamb <0.10 Class 0 kU/L    S251-[id number] Alpha-Gal <0.10 Class 0 kU/L   CBC & Differential    Collection Time: 07/22/25  1:55 PM    Specimen: Blood   Result Value Ref Range    WBC 6.35 3.40 - 10.80 10*3/mm3    RBC 4.05 (L) 4.14 - 5.80 10*6/mm3    Hemoglobin 13.5 13.0 - 17.7 g/dL    Hematocrit 39.9 37.5 - 51.0 %    MCV 98.5 (H) 79.0 - 97.0 fL    MCH 33.3 (H) 26.6 - 33.0 pg    MCHC 33.8 31.5 - 35.7 g/dL    RDW 14.3 12.3 - 15.4 %    Platelets 230 140 - 450 10*3/mm3    Neutrophil Rel % 72.4 42.7 - 76.0 %    Lymphocyte Rel % 16.1 (L) 19.6 - 45.3 %    Monocyte Rel % 8.5 5.0 - 12.0 %    Eosinophil Rel % 2.2 0.3 - 6.2 %    Basophil Rel % 0.5 0.0 - 1.5 %    Neutrophils Absolute 4.60 1.70 - 7.00 10*3/mm3    Lymphocytes Absolute 1.02 0.70 - 3.10 10*3/mm3    Monocytes Absolute 0.54 0.10 - 0.90 10*3/mm3    Eosinophils Absolute 0.14 0.00 - 0.40 10*3/mm3    Basophils Absolute 0.03 0.00 - 0.20 10*3/mm3    Immature Granulocyte Rel % 0.3 0.0 - 0.5 %    Immature Grans Absolute 0.02 0.00 - 0.05 10*3/mm3    nRBC 0.0 0.0 - 0.2 /100 WBC         Assessment & Plan     DM  Diet changes going well.    Ha1c gong down.     Working out 2-3 nights a week.    Stopped metformin.   Renal function out a bit. May be lab       Diarrhea better.    Acute renal insuff.  Will monitor.      Will decrease gilp bid ot qd and wean off.      Bs getting better. Pt over 65. Garth need to wean glip      Diagnoses and all orders for this visit:    1. Type 2 diabetes mellitus without complication, without long-term current use of insulin (Primary)  -     Comprehensive Metabolic Panel  -     Hemoglobin A1c  -     Microalbumin / Creatinine Urine Ratio - Urine, Clean Catch    2. Acute renal insufficiency      Return in  about 8 weeks (around 9/22/2025) for Medicare Wellness, Annual physical, Recheck.          There are no Patient Instructions on file for this visit.     Manuel Aaron MD    Assessment & Plan

## 2025-08-04 RX ORDER — CLONAZEPAM 1 MG/1
1 TABLET ORAL
OUTPATIENT
Start: 2025-08-04

## 2025-08-27 RX ORDER — GLIPIZIDE 5 MG/1
5 TABLET ORAL
Qty: 60 TABLET | Refills: 0 | Status: SHIPPED | OUTPATIENT
Start: 2025-08-27